# Patient Record
Sex: MALE | Race: BLACK OR AFRICAN AMERICAN | NOT HISPANIC OR LATINO | Employment: UNEMPLOYED | URBAN - METROPOLITAN AREA
[De-identification: names, ages, dates, MRNs, and addresses within clinical notes are randomized per-mention and may not be internally consistent; named-entity substitution may affect disease eponyms.]

---

## 2023-05-16 ENCOUNTER — HOSPITAL ENCOUNTER (EMERGENCY)
Facility: CLINIC | Age: 23
Discharge: HOME | End: 2023-05-17
Attending: EMERGENCY MEDICINE | Admitting: EMERGENCY MEDICINE

## 2023-05-16 DIAGNOSIS — R45.89 SUICIDAL BEHAVIOR WITHOUT ATTEMPTED SELF-INJURY: ICD-10-CM

## 2023-05-16 PROCEDURE — 99285 EMERGENCY DEPT VISIT HI MDM: CPT | Mod: 25

## 2023-05-16 ASSESSMENT — ACTIVITIES OF DAILY LIVING (ADL): ADLS_ACUITY_SCORE: 35

## 2023-05-17 VITALS
SYSTOLIC BLOOD PRESSURE: 125 MMHG | RESPIRATION RATE: 16 BRPM | DIASTOLIC BLOOD PRESSURE: 67 MMHG | HEART RATE: 80 BPM | OXYGEN SATURATION: 97 % | TEMPERATURE: 98.3 F

## 2023-05-17 PROCEDURE — 90791 PSYCH DIAGNOSTIC EVALUATION: CPT

## 2023-05-17 ASSESSMENT — COLUMBIA-SUICIDE SEVERITY RATING SCALE - C-SSRS
TOTAL  NUMBER OF INTERRUPTED ATTEMPTS LIFETIME: NO
6. HAVE YOU EVER DONE ANYTHING, STARTED TO DO ANYTHING, OR PREPARED TO DO ANYTHING TO END YOUR LIFE?: NO
ATTEMPT LIFETIME: NO
TOTAL  NUMBER OF ABORTED OR SELF INTERRUPTED ATTEMPTS LIFETIME: NO
2. HAVE YOU ACTUALLY HAD ANY THOUGHTS OF KILLING YOURSELF?: NO
1. HAVE YOU WISHED YOU WERE DEAD OR WISHED YOU COULD GO TO SLEEP AND NOT WAKE UP?: NO

## 2023-05-17 ASSESSMENT — ACTIVITIES OF DAILY LIVING (ADL)
ADLS_ACUITY_SCORE: 35
ADLS_ACUITY_SCORE: 35

## 2023-05-17 NOTE — ED NOTES
DEC requested that patient get in touch with his mother to speak regarding treatment plan despite patient being his own guardian. Patient attempted to contact mother but was unable to do so. Phone number provided to patient for mother to call if he is able to make contact. DEC and RN also unable to get in touch with mother at this time.

## 2023-05-17 NOTE — CONSULTS
Diagnostic Evaluation Consultation  Crisis Assessment    Patient was assessed: Jimena  Patient location:  [facility] Emergency Department   Was a release of information signed: No. Reason: patient declined      Referral Data and Chief Complaint  Criss is a 22 year old, who uses he/him pronouns, and presents to the ED via EMS. Patient is referred to the ED by family/friends. Patient is presenting to the ED for the following concerns: suicide    Informed Consent and Assessment Methods     Patient is his own guardian. Writer met with patient and explained the crisis assessment process, including applicable information disclosures and limits to confidentiality, assessed understanding of the process, and obtained consent to proceed with the assessment. Patient was observed to be able to participate in the assessment as evidenced by his agreement.. Assessment methods included conducting a formal interview with patient, review of medical records, collaboration with medical staff, and obtaining relevant collateral information from family and community providers when available..     Over the course of this crisis assessment provided reassurance, offered validation, engaged patient in problem solving and disposition planning and worked with patient on safety and aftercare planning. Patient's response to interventions was positive.      Summary of Patient Situation    Patient reports that he was brought to the Emergency Department by the ambulance after his family called the police on him. Patient reports that earlier in the day he had been playing basketball with friends and went home. He wanted to borrow his mother s car to go for a ride and clear his head. She gave permission for him to use her car. They started arguing. It is unclear what the argument was about. His mother kept on talking to him including following him to his bedroom. He became more irritated and annoyed at his mother. He tried to create  "some distance between them and \"\"just\" grabbed a knife, threatening to harm himself. The patient denies suicidal thoughts or plan. He repeatedly states he grabbed the knife only because he was trying to create space between them. He denies any thoughts to harm his mother or anyone else. The patient endorses symptoms of frequently being upset, irritated and difficulty calming down. He reports that things have been difficult since the death of his grandmother, three years ago. He often thinks of her. Patient is currently unemployed and has been working random jobs.     Patient denies any history of using substances. He denies any outpatient mental health treatment, currently and in the past.     Brief Psychosocial History    Patient lives at home with his mother and younger sibling. Patient graduated from high school. Patient is unemployed and has been working random jobs.   Patient plays basket ball as a hobby.     Significant Clinical History    Patient denies any history of mental health diagnosis and treatment. Patient denies use of substances     Collateral Information     called and left a voicemail message for patient's mother, Addy Canchola ([phone]. No return call.  also called patient's sister Steve @ [phone] unsuccessfully.      Risk Assessment      Castro Suicide Severity Rating Scale Full Clinical Version: 5/17/2023  Suicidal Ideation  1. Wish to be Dead (Lifetime): No  2. Non-Specific Active Suicidal Thoughts (Lifetime): No     Suicidal Behavior  Actual Attempt (Lifetime): No  Has subject engaged in non-suicidal self-injurious behavior? (Lifetime): No  Interrupted Attempts (Lifetime): No  Aborted or Self-Interrupted Attempt (Lifetime): No  Preparatory Acts or Behavior (Lifetime): No  C-SSRS Risk (Lifetime/Recent)  Calculated C-SSRS Risk Score (Lifetime/Recent): No Risk Indicated    Castro Suicide Severity Rating Scale Since Last Contact: 5/17/2023    Validity of " evaluation is not impacted by presenting factors during interview: Patient is calm and coherent.  Comments regarding subjective versus objective responses to [place] tool: n.a.   Environmental or Psychosocial Events: loss of a loved one, work or task failure, challenging interpersonal relationships, impulsivity/recklessness and unemployment/underemployment  Chronic Risk Factors: other: anxiety and stressors of unemployment   Warning Signs: anxiety, agitation, unable to sleep, sleeping all the time  Protective Factors: strong bond to family unit, community support, or employment and able to access care without barriers  Interpretation of Risk Scoring, Risk Mitigation Interventions and Safety Plan:  Patient denies suicidal thoughts and plan. Patient is engaging and cooperative.        Does the patient have thoughts of harming others? No     Is the patient engaging in sexually inappropriate behavior?  no      Current Substance Abuse     Is there recent substance abuse? no     Was a urine drug screen or blood alcohol level obtained: No    Mental Status Exam     Affect: Appropriate   Appearance: Appropriate    Attention Span/Concentration: Attentive  Eye Contact: Engaged   Fund of Knowledge: Appropriate    Language /Speech Content: Fluent   Language /Speech Volume: Normal    Language /Speech Rate/Productions: Normal    Recent Memory: Intact   Remote Memory: Intact   Mood: Anxious and Irritable    Orientation to Person: Yes    Orientation to Place: Yes   Orientation to Time of Day: Yes    Orientation to Date: Yes    Situation (Do they understand why they are here?): Yes    Psychomotor Behavior: Normal    Thought Content: Clear   Thought Form: Intact      History of commitment: No     Medication    Psychotropic medications:  None  Medication changes made in the last two weeks: NA.    Current Care Team    Primary Care Provider:  No    Psychiatrist: No   Therapist: No  :   No      Diagnosis    Adjustment  Disorders  309.28 (F43.23) With mixed anxiety and depressed mood    Clinical Summary and Substantiation of Recommendations    The patient presents to the Emergency Department following a conflict with his mother and him threatening to harm himself with a weapon to harm self.   The patient reports that he grabbed a knife impulsively to create space between himself and his mother. He denies suicidal thoughts and a plan. He endorses symptoms of grief following the death of his grandmother, frequently feeling not heard, his opinion not counting, being irritable, upset and angry. Patient denies use of any illicit substances. Patient is unemployed and lives with his mother.   The patient is calm and coherent in the Emergency Department. The patient is engaging and cooperative with aftercare and safety planning.  was unable to obtain collateral information from patient's mother and older sister. After consultation with Dr Aubrey Tavarez, the patient is discharged with outpatient treatment.  Patient does not have established care providers. Patient agreed to outpatient treatment.     Disposition    Recommended disposition:   Individual Therapy     Reviewed case and recommendations with attending provider. Attending Name: Dr Aubrey Alcaraz MD      Attending concurs with disposition: Yes       Patient and/or validated legal guardian concurs with disposition: Yes       Outpatient Details (if applicable):   Aftercare plan and appointments placed in the AVS and provided to patient: Yes. Given to patient by ED staff    Was lethal means counseling provided as a part of aftercare planning?  No.       Assessment Details    Patient interview started at: 12: 55 AM and completed at: 1:30 am      Total duration spent on the patient case in minutes:  .75 hrs     CPT code(s) utilized: 72022 - Psychotherapy for Crisis - 60 (30-74*) min       MARI Grider, LICSW, Psychotherapist  DEC - Triage & Transition  Services  Callback: [phone]      APPOINTMENTS    Renetta Hudson MA  [facility]  Clinical and Developmental Services [facility]  [address] [phone] 5/23/2023 9:00 am      Aftercare Plan  If I am feeling unsafe or I am in a crisis, I will:   Contact my established care providers   Call the National Suicide Prevention Lifeline: 988  Go to the nearest emergency room   Call 911   [facility] Crisis  [phone]    Warning signs that I or other people might notice when a crisis is developing for me:  I'm feeling triggered when 1. I'm not being heard by others, 2. When my opinion does not count. 3. When others do not take my serious; I'm feeling frustrated, irritated, angry and upset.     Things I am able to do on my own to cope or help me feel better:  Watch a movie, take a deep breath.     Things that I am able to do with others to cope or help me better:  Talk to a therapist about my stressors, my feelings.     Things I can use or do for distraction:  Watch a movie, play basketball.    Changes I can make to support my mental health and wellness:  Talk to a therapist    People in my life that I can ask for help:  My mom.    Your [facility] has a mental health crisis team you can call 24/7: [facility] Crisis  [phone]    Other things that are important when I'm in crisis:  Take a break, go to my room & call Crisis     Additional resources and information:  Use coping plan from therapist.     Crisis Lines  Crisis Text Line  Text 697725  You will be connected with a trained live crisis counselor to provide support.    Por espanol, texto  SARMAD a 902208 o texto a 442-AYUDAME en WhatsApp    [employer] (LGBTQ Youth Crisis Line)  4.609.357.8555  text START to 002-837      Community Resources  Fast Tracker  Linking people to mental health and substance use disorder resources  [employer].Let's Jock     [place] Mental Health Warm Line  Peer to peer support  Monday thru Saturday, 12 pm to 10 pm   "742.837.8574 or 6.881.071.3659  Text \"Support\" to 26839    National [place] on Mental Illness (FILOMENA)  346.390.0047 or 1.888.FILOMENA.HELPS      Mental Health Apps  My3  https://my3app.org/    VirtualHopeBox  https://[employer]/apps/virtual-hope-box/      Additional Information  Today you were seen by a licensed mental health professional through Triage and Transition services, [facility] ([facility])  for a crisis assessment in the Emergency Department at [facility].  It is recommended that you follow up with your established providers (psychiatrist, mental health therapist, and/or primary care doctor - as relevant) as soon as possible. Coordinators from [facility] will be calling you in the next 24-48 hours to ensure that you have the resources you need.  You can also contact [facility] coordinators directly at [phone]. You may have been scheduled for or offered an appointment with a mental health provider. [facility] maintains an extensive network of licensed behavioral health providers to connect patients with the services they need.  We do not charge providers a fee to participate in our referral network.  We match patients with providers based on a patient's specific needs, insurance coverage, and location.  Our first effort will be to refer you to a provider within your care system, and will utilize providers outside your care system as needed.              "

## 2023-05-17 NOTE — DISCHARGE INSTRUCTIONS
Scheduled Appointment    Type: Teletherapy - Virtual  Date: Tuesday, 5/23/2023    Time: 9:00 am - 9:55 am  Provider: Renetta Hudson MA  [facility]  Location: Clinical and Developmental Services [facility], [address] [place] [address]  Phone: [phone]  Patient Instructions  Telehealth/Virtual services only. No in-person services. New patients are contacted via phone/email by CDS office managers (to confirm information), before 1st appointment. Electronic device w/video capabilities required for services. New patients have e-paperwork to complete prior to 1st appointment (access to e-docs given by CDS  during intake). Call CDS w/questions [phone] or email [email or web address]    Aftercare Plan  If I am feeling unsafe or I am in a crisis, I will:   Contact my established care providers   Call the National Suicide Prevention Lifeline: 988  Go to the nearest emergency room   Call 911   [facility] Crisis  [phone]    Warning signs that I or other people might notice when a crisis is developing for me:  I'm feeling triggered when 1. I'm not being heard by others, 2. When my opinion does not count. 3. When others do not take my serious; I'm feeling frustrated, irritated, angry and upset.     Things I am able to do on my own to cope or help me feel better:  Watch a movie, take a deep breath.     Things that I am able to do with others to cope or help me better:  Talk to a therapist about my stressors, my feelings.     Things I can use or do for distraction:  Watch a movie, play basketball.    Changes I can make to support my mental health and wellness:  Talk to a therapist    People in my life that I can ask for help:  My mom.    Your [facility] has a mental health crisis team you can call 24/7: [facility] Crisis  [phone]    Other things that are important when I'm in crisis:  Take a break, go to my room & call Crisis     Additional resources and information:  Use  "coping plan from therapist.     Crisis Lines  Crisis Text Line  Text 819505  You will be connected with a trained live crisis counselor to provide support.    Por espanol, texto  SARMAD a 241885 o texto a 442-AYUDAME en Whatpp    The [employer] Project (LGBTQ Youth Crisis Line)  0.637.836.2958  text START to 663-802      Community [employer]  Fast Tracker  Linking people to mental health and substance use disorder resources  [employer]     [place] Mental Health Warm Line  Peer to peer support  Monday thru Saturday, 12 pm to 10 pm  530.692.0581 or 4.586.622.9227  Text \"Support\" to 23626    National [place] on Mental Illness (FILOMENA)  126.601.5394 or 1.888.FILOMENA.HELPS      Mental Health Apps  My3  https://Neurologix.org/    VirtualHopeBox  https://[employer]/apps/virtual-hope-box/      Additional Information  Today you were seen by a licensed mental health professional through Triage and Transition services, [facility] ([facility])  for a crisis assessment in the Emergency Department at [facility].  It is recommended that you follow up with your established providers (psychiatrist, mental health therapist, and/or primary care doctor - as relevant) as soon as possible. Coordinators from [facility] will be calling you in the next 24-48 hours to ensure that you have the resources you need.  You can also contact [facility] coordinators directly at [phone]. You may have been scheduled for or offered an appointment with a mental health provider. [facility] maintains an extensive network of licensed behavioral health providers to connect patients with the services they need.  We do not charge providers a fee to participate in our referral network.  We match patients with providers based on a patient's specific needs, insurance coverage, and location.  Our first effort will be to refer you to a provider within your care system, and will utilize providers outside your care system as needed.  "       Discharge Instructions  Mental Health Concerns    You were seen today for mental health concerns, such as depression, anxiety, or suicidal thinking. Your provider feels that you do not require hospitalization at this time. However, your symptoms may become worse, and you may need to return to the Emergency Department. Most treatments of depression and suicidal thoughts are a process rather than a single intervention.  Medications and counseling can take several weeks or more to help.    Generally, every Emergency Department visit should have a follow-up clinic visit with either a primary or a specialty clinic/provider. Please follow-up as instructed by your emergency provider today.    By accepting these discharge instructions:  You promise to not harm yourself or others.  You agree that if you feel you are becoming unable to keep that promise, you will do something to help yourself before you do anything to harm yourself or others.   You agree to keep any safety plan arranged on your visit here today.  You agree to take any medication prescribed or recommended by your provider.  If you are getting worse, you can contact a friend or a family member, contact your counselor or family provider, contact a crisis line, or other options discussed with the provider or therapist today.  At any time, you can call 911 and return to the Emergency Department for more help.  You understand that follow-up is essential to your treatment, and you will make and keep appointments recommended on your visit today.    How to improve your mental health and prevent suicide:  Involve others by letting family, friends, counselors know.  Do not isolate yourself.  Avoid alcohol or drugs. Remove weapons, poisons from your home.  Try to stick to routines for eating, sleeping and getting regular exercise.    Try to get into sunlight. Bright natural light not only treats seasonal affective disorder but also depression.  Increase safe  activities that you enjoy.    If you feel worse, contact 1-800-suicide (1-547.608.5607), or call 911, or your primary provider/counselor for additional assistance.    If you were given a prescription for medicine here today, be sure to read all of the information (including the package insert) that comes with your prescription.  This will include important information about the medicine, its side effects, and any warnings that you need to know about.  The pharmacist who fills the prescription can provide more information and answer questions you may have about the medicine.  If you have questions or concerns that the pharmacist cannot address, please call or return to the Emergency Department.   Remember that you can always come back to the Emergency Department if you are not able to see your regular provider in the amount of time listed above, if you get any new symptoms, or if there is anything that worries you.

## 2023-05-17 NOTE — ED TRIAGE NOTES
Pt brought by EMS on police hold for suicidal thoughts. Pt states that in the heat of the moment made some statements about harming himself to his mom but denies being suicidal at this time. A&O x4     Triage Assessment     Row Name 05/16/23 0026       Triage Assessment (Adult)    Airway WDL WDL       Respiratory WDL    Respiratory WDL WDL       Skin Circulation/Temperature WDL    Skin Circulation/Temperature WDL WDL       Cardiac WDL    Cardiac WDL WDL       Peripheral/Neurovascular WDL    Peripheral Neurovascular WDL WDL       Cognitive/Neuro/Behavioral WDL    Cognitive/Neuro/Behavioral WDL WDL       Greg Coma Scale    Best Eye Response 4-->(E4) spontaneous    Best Motor Response 6-->(M6) obeys commands    Best Verbal Response 5-->(V5) oriented    Greg Coma Scale Score 15
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
